# Patient Record
Sex: FEMALE | Race: WHITE | Employment: FULL TIME | URBAN - METROPOLITAN AREA
[De-identification: names, ages, dates, MRNs, and addresses within clinical notes are randomized per-mention and may not be internally consistent; named-entity substitution may affect disease eponyms.]

---

## 2020-11-11 ENCOUNTER — OFFICE VISIT (OUTPATIENT)
Dept: ORTHOPEDIC SURGERY | Age: 65
End: 2020-11-11
Payer: MEDICARE

## 2020-11-11 VITALS — BODY MASS INDEX: 28.34 KG/M2 | HEIGHT: 68 IN | WEIGHT: 187 LBS

## 2020-11-11 PROCEDURE — 99213 OFFICE O/P EST LOW 20 MIN: CPT | Performed by: ORTHOPAEDIC SURGERY

## 2020-11-11 NOTE — PROGRESS NOTES
Date of Encounter: 2020  Patient Name:Sara Trejo [address]    Chief Complaint   Patient presents with    Knee Pain     hx of right total knee arthroplasty       History of Present Illness:  Patient is a year out from her right total knee. Overall quite pleased with it. Still sore if she overdoes it but she is trying to walk every day. She is lost 27 pounds which is helped. Past Medical History:   Diagnosis Date    High cholesterol     Tachycardia     Thyroid disease     Tubular adenoma of colon        Past Surgical History:   Procedure Laterality Date    BLADDER SURGERY      CARPAL TUNNEL RELEASE       SECTION      x2    COLONOSCOPY  11/23/15    [facility] [place] adenoma    HERNIA REPAIR         Current Outpatient Medications   Medication Sig Dispense Refill    Pravastatin Sodium (PRAVACHOL PO) Take by mouth      aspirin 81 MG EC tablet Take 81 mg by mouth daily.  diltiazem (TIAZAC) 240 MG SR capsule Take 1 Cap by mouth daily.  levothyroxine (SYNTHROID) 112 MCG tablet TAKE 1 TABLET BY MOUTH DAILY       No current facility-administered medications for this visit. allergies, social and family histories were reviewed and updated as appropriate. Review of Systems:  Relevant review of systems reviewed and available in the patient's chart and scanned in under the MEDIA tab on 2020. Vital Signs:  Ht 5' 8\" (1.727 m)   Wt 187 lb (84.8 kg)   BMI 28.43 kg/m²     General Exam:   Constitutional: She is adequately groomed with no evidence of malnutrition, obesity absent  Mental Status: She is oriented to time, place and person. Normal mentation and affect for age. Lymphatic: The lymphatic examination bilaterally reveals all areas to be without enlargement or induration. Vascular: Examination reveals no swelling or calf tenderness. Peripheral pulses are palpable and 2+. Neurological: She has good coordination and balance. There is no focal weakness or sensory deficit.     Pertinent Exam:  Right knee has essentially full extension probably 120 degrees of flexion. Very minimal swelling. Good stability. Good quad strength. Xray Findings:  3 views of the right knee show well-positioned total knee implant. Standing AP of the left knee shows very minimal medial narrowing    Assessment & Plan:  Patient is done well and is quite pleased with her right total knee replacement. Her should continue with her home exercise program.  Gradually advance her activities. Will follow up with me as needed      Documentation was done using voice recognition dragon software. Every effort was made to ensure accuracy; however, inadvertent  Unintentional computerized transcription errors may be present.

## 2021-03-30 ENCOUNTER — NURSE ONLY (OUTPATIENT)
Dept: PRIMARY CARE CLINIC | Age: 66
End: 2021-03-30